# Patient Record
Sex: FEMALE | Race: OTHER | NOT HISPANIC OR LATINO
[De-identification: names, ages, dates, MRNs, and addresses within clinical notes are randomized per-mention and may not be internally consistent; named-entity substitution may affect disease eponyms.]

---

## 2019-03-28 PROBLEM — Z00.00 ENCOUNTER FOR PREVENTIVE HEALTH EXAMINATION: Status: ACTIVE | Noted: 2019-03-28

## 2019-04-03 ENCOUNTER — APPOINTMENT (OUTPATIENT)
Dept: SURGERY | Facility: CLINIC | Age: 44
End: 2019-04-03
Payer: COMMERCIAL

## 2019-04-03 PROCEDURE — 99205K: CUSTOM

## 2019-10-09 ENCOUNTER — RESULT REVIEW (OUTPATIENT)
Age: 44
End: 2019-10-09

## 2019-10-10 ENCOUNTER — APPOINTMENT (OUTPATIENT)
Dept: SURGERY | Facility: CLINIC | Age: 44
End: 2019-10-10
Payer: COMMERCIAL

## 2019-10-10 ENCOUNTER — APPOINTMENT (OUTPATIENT)
Dept: PLASTIC SURGERY | Facility: CLINIC | Age: 44
End: 2019-10-10
Payer: COMMERCIAL

## 2019-10-10 ENCOUNTER — RESULT CHARGE (OUTPATIENT)
Age: 44
End: 2019-10-10

## 2019-10-10 DIAGNOSIS — J93.83 OTHER PNEUMOTHORAX: ICD-10-CM

## 2019-10-10 DIAGNOSIS — Z80.3 FAMILY HISTORY OF MALIGNANT NEOPLASM OF BREAST: ICD-10-CM

## 2019-10-10 DIAGNOSIS — Z78.9 OTHER SPECIFIED HEALTH STATUS: ICD-10-CM

## 2019-10-10 PROCEDURE — 99024 POSTOP FOLLOW-UP VISIT: CPT

## 2019-10-10 PROCEDURE — 99213 OFFICE O/P EST LOW 20 MIN: CPT

## 2019-10-10 NOTE — HISTORY OF PRESENT ILLNESS
[FreeTextEntry1] : 44 y/o woman s/p multiple cosmetic breast procedures, now with wise pattern scars.\par Indicated for right breast mass excision.\par Otherwise healthy.\par \par Exam: b/l AB cup size breasts, nonptotic, symmetric (R>L), wise pattern scars\par \par Because concern for creating breast deformity following resection will plan to perform breast tissue rearrangement to preserve a natural breast contour. Will utilize existing scars.\par NAture of surgery, risks, benefits, alternatives, expected postoperative course, recovery and long term results reviewed. All questions answered.\par Will coordinate with Dr. Robledo for the near future.\par

## 2019-10-15 NOTE — HISTORY OF PRESENT ILLNESS
[FreeTextEntry1] : Mounika is a 42 yo female presents for consultation regarding right breast papilloma found on diagnostic mammogram. To note, she was previously seen by Dr. Nichol Robison at [facility]. 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 LEWIS of 18.4%\par \par She has a history of breast implant placement and removal. Initial breast saline implant placement in  with subsequent surgery secondary to capsular contracture of the right breast with removal and replacement of b/l breast implants. In 2016, she underwent b/l breast implant removal with capsulectomy followed by mastopexy 3-4 months later. \par \par Family h/o cancer: mother dx at age 70 with DCIS, father diagnosed with colon cancer age 50 and  from the disease\par Mother underwent genetic testing with reportedly negative results (VUS)

## 2019-10-15 NOTE — DATA REVIEWED
[FreeTextEntry1] : --2/13/19 (R) b/l mmg & right US: echogenic area at palpable concern in the right breast at 1n8, may be related to prior surgeries but an US guided biopsy is recommended. Right breast amorphous and punctate calcifications seen in LIQ spanning 3cm, recommend stereotactic biopsy. BI-RADS 4. \par --2/25/19 (R) right breast stereotactic biopsy pathology small papilloma with usual ductal hyperplasia and calcifications. US biopsy, right: benign breast tissue w/ dense stromal fibrosis.

## 2019-10-15 NOTE — PAST MEDICAL HISTORY
[Menstruating] : The patient is menstruating [Menarche Age ____] : age at menarche was [unfilled] [Total Preg ___] : G[unfilled] [Definite ___ (Date)] : the last menstrual period was [unfilled] [Living ___] : Living: [unfilled] [Live Births ___] : P[unfilled]  [Age At Live Birth ___] : Age at live birth: [unfilled] [FreeTextEntry5] :  in 2001 [FreeTextEntry6] : NA [FreeTextEntry7] : NA [FreeTextEntry8] : No hx

## 2019-10-25 ENCOUNTER — APPOINTMENT (OUTPATIENT)
Dept: MRI IMAGING | Facility: HOSPITAL | Age: 44
End: 2019-10-25

## 2019-10-25 ENCOUNTER — OUTPATIENT (OUTPATIENT)
Dept: OUTPATIENT SERVICES | Facility: HOSPITAL | Age: 44
LOS: 1 days | End: 2019-10-25
Payer: COMMERCIAL

## 2019-10-25 PROCEDURE — C8937: CPT

## 2019-10-25 PROCEDURE — A9585: CPT

## 2019-10-25 PROCEDURE — 77049 MRI BREAST C-+ W/CAD BI: CPT

## 2019-10-25 PROCEDURE — 77049 MRI BREAST C-+ W/CAD BI: CPT | Mod: 26

## 2019-10-28 ENCOUNTER — APPOINTMENT (OUTPATIENT)
Dept: INTERNAL MEDICINE | Facility: CLINIC | Age: 44
End: 2019-10-28

## 2019-11-01 ENCOUNTER — APPOINTMENT (OUTPATIENT)
Dept: MRI IMAGING | Facility: HOSPITAL | Age: 44
End: 2019-11-01

## 2019-11-04 ENCOUNTER — RESULT REVIEW (OUTPATIENT)
Age: 44
End: 2019-11-04

## 2019-11-04 ENCOUNTER — OUTPATIENT (OUTPATIENT)
Dept: OUTPATIENT SERVICES | Facility: HOSPITAL | Age: 44
LOS: 1 days | End: 2019-11-04
Payer: COMMERCIAL

## 2019-11-04 ENCOUNTER — APPOINTMENT (OUTPATIENT)
Dept: MAMMOGRAPHY | Facility: HOSPITAL | Age: 44
End: 2019-11-04

## 2019-11-04 PROCEDURE — 19281 PERQ DEVICE BREAST 1ST IMAG: CPT | Mod: 59,RT

## 2019-11-04 PROCEDURE — 19083 BX BREAST 1ST LESION US IMAG: CPT | Mod: RT

## 2019-11-04 PROCEDURE — 19084 BX BREAST ADD LESION US IMAG: CPT | Mod: LT

## 2019-11-04 PROCEDURE — 76642 ULTRASOUND BREAST LIMITED: CPT | Mod: 26,50

## 2019-11-05 LAB — SURGICAL PATHOLOGY STUDY: SIGNIFICANT CHANGE UP

## 2019-11-05 PROCEDURE — 88305 TISSUE EXAM BY PATHOLOGIST: CPT

## 2019-11-05 PROCEDURE — 77065 DX MAMMO INCL CAD UNI: CPT

## 2019-11-05 PROCEDURE — 77065 DX MAMMO INCL CAD UNI: CPT | Mod: 26,RT

## 2019-11-05 PROCEDURE — C1739: CPT

## 2019-11-05 PROCEDURE — A4648: CPT

## 2019-11-05 PROCEDURE — 19083 BX BREAST 1ST LESION US IMAG: CPT

## 2019-11-05 PROCEDURE — 76642 ULTRASOUND BREAST LIMITED: CPT

## 2019-11-05 PROCEDURE — 19084 BX BREAST ADD LESION US IMAG: CPT

## 2019-11-05 PROCEDURE — 19281 PERQ DEVICE BREAST 1ST IMAG: CPT

## 2019-11-17 ENCOUNTER — RESULT REVIEW (OUTPATIENT)
Age: 44
End: 2019-11-17

## 2019-11-21 VITALS
SYSTOLIC BLOOD PRESSURE: 104 MMHG | OXYGEN SATURATION: 98 % | HEART RATE: 80 BPM | TEMPERATURE: 98 F | DIASTOLIC BLOOD PRESSURE: 71 MMHG | RESPIRATION RATE: 18 BRPM | HEIGHT: 67 IN | WEIGHT: 132.72 LBS

## 2019-11-21 NOTE — ASU PATIENT PROFILE, ADULT - PMH
Breast anomaly  RIGHT BREAST PAPILLOMA Breast anomaly  RIGHT BREAST PAPILLOMA  Pneumothorax Breast anomaly  RIGHT BREAST PAPILLOMA  Pneumothorax  Left

## 2019-11-21 NOTE — ASU PATIENT PROFILE, ADULT - PSH
Elective surgery  mamoplasty Elective surgery  breast implant removed  Elective surgery    Elective surgery  mamoplasty  History of     History of lung surgery  VATS

## 2019-11-22 ENCOUNTER — APPOINTMENT (OUTPATIENT)
Dept: SURGERY | Facility: CLINIC | Age: 44
End: 2019-11-22
Payer: COMMERCIAL

## 2019-11-22 ENCOUNTER — RESULT REVIEW (OUTPATIENT)
Age: 44
End: 2019-11-22

## 2019-11-22 ENCOUNTER — OUTPATIENT (OUTPATIENT)
Dept: INPATIENT UNIT | Facility: HOSPITAL | Age: 44
LOS: 1 days | Discharge: HOME | End: 2019-11-22
Payer: COMMERCIAL

## 2019-11-22 VITALS
SYSTOLIC BLOOD PRESSURE: 106 MMHG | HEART RATE: 86 BPM | RESPIRATION RATE: 16 BRPM | OXYGEN SATURATION: 99 % | DIASTOLIC BLOOD PRESSURE: 65 MMHG

## 2019-11-22 DIAGNOSIS — Z98.891 HISTORY OF UTERINE SCAR FROM PREVIOUS SURGERY: Chronic | ICD-10-CM

## 2019-11-22 DIAGNOSIS — Z41.9 ENCOUNTER FOR PROCEDURE FOR PURPOSES OTHER THAN REMEDYING HEALTH STATE, UNSPECIFIED: Chronic | ICD-10-CM

## 2019-11-22 DIAGNOSIS — Z98.890 OTHER SPECIFIED POSTPROCEDURAL STATES: Chronic | ICD-10-CM

## 2019-11-22 PROCEDURE — 19366: CPT | Mod: RT

## 2019-11-22 PROCEDURE — 88304 TISSUE EXAM BY PATHOLOGIST: CPT

## 2019-11-22 PROCEDURE — 76098 X-RAY EXAM SURGICAL SPECIMEN: CPT | Mod: 26

## 2019-11-22 PROCEDURE — 19125 EXCISION BREAST LESION: CPT

## 2019-11-22 PROCEDURE — 76098 X-RAY EXAM SURGICAL SPECIMEN: CPT

## 2019-11-22 PROCEDURE — 19125 EXCISION BREAST LESION: CPT | Mod: RT

## 2019-11-22 PROCEDURE — 14301 TIS TRNFR ANY 30.1-60 SQ CM: CPT

## 2019-11-22 RX ORDER — BENZOCAINE AND MENTHOL 5; 1 G/100ML; G/100ML
1 LIQUID ORAL
Refills: 0 | Status: DISCONTINUED | OUTPATIENT
Start: 2019-11-22 | End: 2019-11-22

## 2019-11-22 RX ORDER — ONDANSETRON 8 MG/1
4 TABLET, FILM COATED ORAL ONCE
Refills: 0 | Status: DISCONTINUED | OUTPATIENT
Start: 2019-11-22 | End: 2019-11-22

## 2019-11-22 RX ORDER — SODIUM CHLORIDE 9 MG/ML
1000 INJECTION, SOLUTION INTRAVENOUS
Refills: 0 | Status: DISCONTINUED | OUTPATIENT
Start: 2019-11-22 | End: 2019-11-22

## 2019-11-22 RX ORDER — OXYCODONE AND ACETAMINOPHEN 5; 325 MG/1; MG/1
1 TABLET ORAL ONCE
Refills: 0 | Status: DISCONTINUED | OUTPATIENT
Start: 2019-11-22 | End: 2019-11-22

## 2019-11-22 RX ORDER — BUPIVACAINE 13.3 MG/ML
20 INJECTION, SUSPENSION, LIPOSOMAL INFILTRATION ONCE
Refills: 0 | Status: DISCONTINUED | OUTPATIENT
Start: 2019-11-22 | End: 2019-11-22

## 2019-11-22 RX ORDER — HYDROMORPHONE HYDROCHLORIDE 2 MG/ML
0.5 INJECTION INTRAMUSCULAR; INTRAVENOUS; SUBCUTANEOUS
Refills: 0 | Status: DISCONTINUED | OUTPATIENT
Start: 2019-11-22 | End: 2019-11-22

## 2019-11-22 NOTE — BRIEF OPERATIVE NOTE - OPERATION/FINDINGS
Saviscout localization of breast mass. Excision of breast mass using electrocautery, specimen sent for pathological evaluation. Xray confirmation of clip within specimen. Hemostasis achieved. Plastic surgery primary closure of wound with 3-0 Vicryl and 3-0 Monocril suture.

## 2019-11-22 NOTE — BRIEF OPERATIVE NOTE - NSICDXBRIEFPROCEDURE_GEN_ALL_CORE_FT
PROCEDURES:  Closure, surgical wound, breast, secondary 22-Nov-2019 10:02:28  Laxmi Martel  Breast mass excision 22-Nov-2019 10:01:47  Laxmi Martel

## 2019-11-25 PROBLEM — Q83.9 CONGENITAL MALFORMATION OF BREAST, UNSPECIFIED: Chronic | Status: ACTIVE | Noted: 2019-11-21

## 2019-11-25 PROBLEM — J93.9 PNEUMOTHORAX, UNSPECIFIED: Chronic | Status: ACTIVE | Noted: 2019-11-21

## 2019-11-26 LAB — SURGICAL PATHOLOGY STUDY: SIGNIFICANT CHANGE UP

## 2019-12-03 ENCOUNTER — RESULT REVIEW (OUTPATIENT)
Age: 44
End: 2019-12-03

## 2019-12-05 ENCOUNTER — APPOINTMENT (OUTPATIENT)
Dept: BREAST CENTER | Facility: CLINIC | Age: 44
End: 2019-12-05

## 2019-12-05 ENCOUNTER — APPOINTMENT (OUTPATIENT)
Dept: PLASTIC SURGERY | Facility: CLINIC | Age: 44
End: 2019-12-05
Payer: COMMERCIAL

## 2019-12-05 ENCOUNTER — APPOINTMENT (OUTPATIENT)
Dept: SURGERY | Facility: CLINIC | Age: 44
End: 2019-12-05

## 2019-12-05 ENCOUNTER — APPOINTMENT (OUTPATIENT)
Dept: BREAST CENTER | Facility: CLINIC | Age: 44
End: 2019-12-05
Payer: COMMERCIAL

## 2019-12-05 VITALS
TEMPERATURE: 97.8 F | WEIGHT: 130 LBS | HEART RATE: 82 BPM | DIASTOLIC BLOOD PRESSURE: 76 MMHG | RESPIRATION RATE: 16 BRPM | BODY MASS INDEX: 20.4 KG/M2 | SYSTOLIC BLOOD PRESSURE: 109 MMHG | HEIGHT: 67 IN

## 2019-12-05 DIAGNOSIS — Z80.3 FAMILY HISTORY OF MALIGNANT NEOPLASM OF BREAST: ICD-10-CM

## 2019-12-05 DIAGNOSIS — R92.2 INCONCLUSIVE MAMMOGRAM: ICD-10-CM

## 2019-12-05 PROCEDURE — 99024 POSTOP FOLLOW-UP VISIT: CPT

## 2019-12-06 NOTE — HISTORY OF PRESENT ILLNESS
[FreeTextEntry1] : Mounika is a 43 yo female presents for post-op, she is 2 weeks s/p right excisional biopsy for an intraductal papilloma on 11/22/19, final surgical pathology was benign. LEWIS of 18.4% Patient had no postoperative issues. Saw Dr. Michele today as well. Healing well.\par \par Discussed with patient that she is still high risk, and has strong family history of breast cancer. She would like to be followed in the high risk program. \par \par She has a history of breast implant placement and removal. Initial breast saline implant placement in 2001 with subsequent surgery secondary to capsular contracture of the right breast with removal and replacement of b/l breast implants. In 2016, she underwent b/l breast implant removal with capsulectomy followed by mastopexy 3-4 months later. \par \par

## 2019-12-06 NOTE — PHYSICAL EXAM
[Normocephalic] : normocephalic [Atraumatic] : atraumatic [Supple] : supple [Examined in the supine and seated position] : examined in the supine and seated position [No dominant masses] : no dominant masses in right breast  [No Supraclavicular Adenopathy] : no supraclavicular adenopathy [No dominant masses] : no dominant masses left breast [No Nipple Retraction] : no right nipple retraction [No Nipple Discharge] : no left nipple discharge [No Edema] : no edema [No Axillary Lymphadenopathy] : no left axillary lymphadenopathy [No Ulceration] : no ulceration [No Rashes] : no rashes

## 2019-12-06 NOTE — DATA REVIEWED
[FreeTextEntry1] : --2/13/19 ([facility]) b/l mmg & right US: echogenic area at palpable concern in the right breast at 1n8, may be related to prior surgeries but an US guided biopsy is recommended. Right breast amorphous and punctate calcifications seen in LIQ spanning 3cm, recommend stereotactic biopsy. BI-RADS 4. \par --2/25/19 ([facility]) right breast stereotactic biopsy pathology small papilloma with usual ductal hyperplasia and calcifications. US biopsy, right: benign breast tissue w/ dense stromal fibrosis. \par --10/25/19 ([facility]) b/l breast MRI showing right 11:00 8cmFN a o.8cm circumscribed oval shaped enhancing lesion, possibly an intramammary lymph node however not definite fat is visualized, targeted US and possible US guided core biopsy. Two adjacent enhancing lesions at 9:00 right 3.6cm measuring 0.6cm and 0.3cm, the more lateral of the two possibly represents an IM lymph node, however no definite fat seen, Targeted US with possible US core biopsy recommended. Left 5:30 2.5cmFN 0.7cm enhancing lesion associated with non mass enhancement seen extending 1cm posterior in a linear distribution, targeted US with US biopsy recommended. BIRADS 4 \par --11/4/19 ([facility]) left 5:30 2.5cmFN pathology benign breast tissue with nodular adenosis and fibrocystic change. right 11:00 8cmFN pathology fibroadenomatous and fibrocystic changes. \par \par --11/22/19 ([facility]) right breast excisional biopsy pathology showing intraductal papilloma with apocrine metaplasia and fibroid UDH, surrounding breast with fibrocystic change and duct fibroadenomatoid change and UDH, epithelial microcalcifications present, biopsy clip present\par

## 2019-12-11 NOTE — HISTORY OF PRESENT ILLNESS
[FreeTextEntry1] : 43 y/o F here 2 weeks PO s/p right excisional biopsy on 11/22/19 with local tissue rearrangement in right breast. Doing well. pain controlled. \par Incisions C/D/I, no collection or infection, right breast volume < left\par Do not recommend surgical intervention at this time\par Can consider revision procedure for symmetry in future\par RTC in 3 months

## 2020-02-19 NOTE — HISTORY OF PRESENT ILLNESS
[FreeTextEntry1] : 45 y/o F here 3 months PO s/p right excisional biopsy on 11/22/19 with local tissue rearrangement in right breast. Doing well. pain controlled. \par Incisions C/D/I, no collection or infection, right breast volume < left\par Do not recommend surgical intervention at this time\par Can consider revision procedure for symmetry in future\par RTC in 3 months

## 2020-02-20 ENCOUNTER — APPOINTMENT (OUTPATIENT)
Dept: PLASTIC SURGERY | Facility: CLINIC | Age: 45
End: 2020-02-20

## 2020-02-27 ENCOUNTER — APPOINTMENT (OUTPATIENT)
Dept: PLASTIC SURGERY | Facility: CLINIC | Age: 45
End: 2020-02-27

## 2020-03-23 ENCOUNTER — TRANSCRIPTION ENCOUNTER (OUTPATIENT)
Age: 45
End: 2020-03-23

## 2020-04-26 ENCOUNTER — MESSAGE (OUTPATIENT)
Age: 45
End: 2020-04-26

## 2021-06-22 ENCOUNTER — APPOINTMENT (OUTPATIENT)
Dept: ANTEPARTUM | Facility: CLINIC | Age: 46
End: 2021-06-22

## 2022-07-22 ENCOUNTER — NON-APPOINTMENT (OUTPATIENT)
Age: 47
End: 2022-07-22

## 2022-07-22 PROBLEM — R92.8 ABNORMAL FINDING ON RADIOLOGICAL EXAMINATION OF BREAST: Status: ACTIVE | Noted: 2019-11-04

## 2022-07-22 PROBLEM — D24.1 INTRADUCTAL PAPILLOMA OF RIGHT BREAST: Status: ACTIVE | Noted: 2019-10-10

## 2022-07-22 NOTE — ASSESSMENT
[FreeTextEntry1] : 46 year old female presents for initial evaluation s/p bilateral benign US-guided core biopsies 6/30/22 of right breast 10:00 7cmfn mass revealing fibroadenoma and left breast 11:00 4cmfn mass revealing benign fibrofatty breast tissue, with recommendation for B/L targeted US. Patient is also recommended for follow-up breast MRI upon [facility] review of outside MRI 2019 demonstrated multiple b/l enhancing lesions which required b/l US-guided biopsies in 2019, results benign. Of note, patient with history of right excisional biopsy with local tissue rearrangement for IDP on 11/22/19, final surgical pathology benign. Patient also has a history of breast implant placement and removal.  LEWIS of 18.4%. \par \par Patient without complaint. Physical exam WNL. Plan B/L MRI in 1-2 weeks, will call with results, and if benign, plan for B/L targeted US and re-examination in December 2022??? Patient verbalizes understanding and is in agreement with the plan.\par

## 2022-07-22 NOTE — HISTORY OF PRESENT ILLNESS
[FreeTextEntry1] : 46 year old female referred by Dr. Krause (type of Dr?) who presents for initial evaluation s/p bilateral benign US-guided core biopsies 22 of right breast 10:00 7cmfn mass revealing fibroadenoma and left breast 11:00 4cmfn mass revealing benign fibrofatty breast tissue, with recommendation for B/L targeted US. Patient is also recommended for follow-up breast MRI upon [facility] review of outside MRI  demonstrated multiple b/l enhancing lesions which required b/l US-guided biopsies in 2019, results benign. \par \par Of note, patient was last seen 19 by Dr. Rush and plastic surgeon Dr. Michele s/p right excisional biopsy with local tissue rearrangement for an intraductal papilloma on 19, final surgical pathology benign.\par \par Patient also has a history of breast implant placement and removal. Initial breast saline implant placement in  with subsequent surgery secondary to capsular contracture of the right breast with removal and replacement of b/l breast implants. In , she underwent b/l breast implant removal with capsulectomy followed by mastopexy 3-4 months later. \par \par Famhx:\par Mother dx at age 70 with DCIS, father diagnosed with colon cancer age 50 and  from the disease\par Mother underwent genetic testing with reportedly negative results (VUS)\par \par LEWIS of 18.4%

## 2022-07-22 NOTE — REASON FOR VISIT
[Consultation] : a consultation visit [FreeTextEntry1] : s/p bilateral benign biopsies, h/o IDP, famhx

## 2022-07-22 NOTE — PHYSICAL EXAM
[Supple] : supple [No Supraclavicular Adenopathy] : no supraclavicular adenopathy [Examined in the supine and seated position] : examined in the supine and seated position [No dominant masses] : no dominant masses in right breast  [No dominant masses] : no dominant masses left breast [No Nipple Retraction] : no left nipple retraction [No Nipple Discharge] : no left nipple discharge [No Axillary Lymphadenopathy] : no left axillary lymphadenopathy [No Edema] : no edema [No Rashes] : no rashes [No Ulceration] : no ulceration [de-identified] : well healing post-surgical scar

## 2022-07-22 NOTE — DATA REVIEWED
[FreeTextEntry1] : --2/13/19 ([facility]) b/l mmg & right US: echogenic area at palpable concern in the right breast at 1n8, may be related to prior surgeries but an US guided biopsy is recommended. Right breast amorphous and punctate calcifications seen in LIQ spanning 3cm, recommend stereotactic biopsy. BI-RADS 4. \par --2/25/19 ([facility]) right breast stereotactic biopsy pathology small papilloma with usual ductal hyperplasia and calcifications. US biopsy, right: benign breast tissue w/ dense stromal fibrosis. \par --10/25/19 ([facility]) b/l breast MRI showing right 11:00 8cmFN a 0.8cm circumscribed oval shaped enhancing lesion, possibly an intramammary lymph node however not definite fat is visualized, targeted US and possible US guided core biopsy. Two adjacent enhancing lesions at 9:00 right 3.6cm measuring 0.6cm and 0.3cm, the more lateral of the two possibly represents an IM lymph node, however no definite fat seen, Targeted US with possible US core biopsy recommended. Left 5:30 2.5cmFN 0.7cm enhancing lesion associated with non mass enhancement seen extending 1cm posterior in a linear distribution, targeted US with US biopsy recommended. BIRADS 4 \par --11/4/19 ([facility]) left 5:30 2.5cmFN pathology benign breast tissue with nodular adenosis and fibrocystic change. right 11:00 8cmFN pathology fibroadenomatous and fibrocystic changes. \par --11/22/19 ([facility]) right breast excisional biopsy pathology showing intraductal papilloma with apocrine metaplasia and fibroid UDH, surrounding breast with fibrocystic change and duct fibroadenomatoid change and UDH, epithelial microcalcifications present, biopsy clip present\par \par 6/13/22 ([facility]) B/L sMMG/US: heterogeneously dense. \par 1. Suspicious findings: Suspicious complex hypoechoic masses at the right 10:00 axis 7cmfn measuring 8 mm and at the left 11:00 axis 4cmfn measuring 7 mm. RECOMMENDATION: Bilateral ultrasound-guided needle biopsies \par 2. Dominant left breast mass seen in the left inferior retroareolar region corresponds to a cyst cluster on ultrasound.\par 3. Status post additional bilateral needle biopsies of the 2019 exam. As always direct comparison with the outside imaging is recommended for these findings.\par 4. Status post interval right excisional biopsy for intraductal papillomas since the 2019 exam.\par BI-RADS 4:  Suspicious. \par 6/14/22 ADDENDUM: The patient submitted her outside mammograms for comparison from 2/25/19 and 2/13/19. Ultrasound images from the biopsy 11/4/2019 was also submitted. \par -The right 11:00 axis lesion documented on the outside study was not documented on the current study. -The pathology of the right breast excisional biopsy performed at the outside institution yielded IDP. -Additional clinical history was submitted demonstrating the patient is status post explantation. \par -Outside MRI scan from 2019 had demonstrated multiple enhancing lesions in both breasts for which biopsy was recommended. Status post left ultrasound core biopsy left 5:30 axis yielding benign breast tissue with nodular adenosis and fibrocystic change in 2019. Status post right ultrasound core biopsy yielding fibroadenomatous and fibrocystic changes at the 11:00 axis in 2019. Follow-up MRI had been recommended and was reportedly performed at [facility] in 2020 (which is not available to time this addendum). In light of prior MRI biopsies, follow-up breast MRI is also recommended in addition to bilateral ultrasound core biopsies.\par FOLLOW-UP: \par 1. Bilateral ultrasound core biopsies as indicated in the body the report.\par 2. Follow-up breast MRI.\par BI-RADS 4:  Suspicious.\par \par 6/30/22 ([facility]) B/L US-GUIDED CORE BIOPSIES x 2 sites:\par Site 1 - Right 10:00 7cmfn mass: fibroadenoma\par Site 2 - Left 11:00 4cmfn mass: benign fibrofatty breast tissue\par Concordant. FOLLOW-UP: 6 month B/L targeted US \par *In addition, however, the patient was reminded to schedule breast MRI (with contrast) given prior MRI guided biopsies and for the purpose of follow-up as per recent recommendations. The patient will provide outside 2021 and 2019 MRI on CD with associated reports.

## 2022-07-26 ENCOUNTER — NON-APPOINTMENT (OUTPATIENT)
Age: 47
End: 2022-07-26

## 2022-07-27 ENCOUNTER — APPOINTMENT (OUTPATIENT)
Dept: BREAST CENTER | Facility: CLINIC | Age: 47
End: 2022-07-27

## 2022-07-27 DIAGNOSIS — R92.8 OTHER ABNORMAL AND INCONCLUSIVE FINDINGS ON DIAGNOSTIC IMAGING OF BREAST: ICD-10-CM

## 2022-07-27 DIAGNOSIS — D24.1 BENIGN NEOPLASM OF RIGHT BREAST: ICD-10-CM

## 2022-09-28 ENCOUNTER — APPOINTMENT (OUTPATIENT)
Dept: NEUROLOGY | Facility: CLINIC | Age: 47
End: 2022-09-28

## 2023-04-10 ENCOUNTER — APPOINTMENT (OUTPATIENT)
Dept: PLASTIC SURGERY | Facility: CLINIC | Age: 48
End: 2023-04-10
Payer: SELF-PAY

## 2023-04-10 VITALS — HEIGHT: 67 IN | OXYGEN SATURATION: 99 % | WEIGHT: 135 LBS | HEART RATE: 72 BPM | BODY MASS INDEX: 21.19 KG/M2

## 2023-04-10 PROCEDURE — 99499 UNLISTED E&M SERVICE: CPT | Mod: NC

## 2023-04-11 NOTE — HISTORY OF PRESENT ILLNESS
[FreeTextEntry1] : 46 y/o female presents  4 years s/p right excisional biopsy on 11/22/19 with local tissue rearrangement in right breast. Doing well. pain controlled. Patient had [place] silicone implants placed in 12/01/2022 with Dr. Jarrett. Patient developed a wound on the right breast, mesh exposed, on 01/03/2023 the right implant was removed. \par Implants: [place] smooth round moderate 100 cc left; 170 cc on the right smooth round moderate plus\par She will confirm implant information and email\par \par Well healed wise pattern incisions, no collection or infection, right breast volume < left, left NAC displaced laterally, right lower pole with lack of fullness, left IMF displaced superiorly\par Discussed replacement of bilateral implants with larger, wider size, revision/plication of left medial IMF incision, right breast will leave medial part of IMF scar due to prior wound healing complications.\par \par Nature of surgery, risks, benefits, alternatives expected postoperative course, recovery and long term results reviewed.\par Will coordinate for the near future.

## 2023-04-11 NOTE — SURGICAL HISTORY
[de-identified] : 11/22/19: right excisional biopsy with local tissue rearrangement in right breast

## 2023-04-19 ENCOUNTER — APPOINTMENT (OUTPATIENT)
Dept: PLASTIC SURGERY | Facility: CLINIC | Age: 48
End: 2023-04-19
Payer: SELF-PAY

## 2023-04-19 ENCOUNTER — TRANSCRIPTION ENCOUNTER (OUTPATIENT)
Age: 48
End: 2023-04-19

## 2023-04-19 PROCEDURE — 99499Q: CUSTOM | Mod: NC

## 2023-04-19 NOTE — HISTORY OF PRESENT ILLNESS
[Home] : at home, [unfilled] , at the time of the visit. [Other Location: e.g. Home (Enter Location, City,State)___] : at [unfilled] [Verbal consent obtained from patient] : the patient, [unfilled] [FreeTextEntry1] : Patient Name: AGUILAR RODRIGUEZ \par : 1975 \par Date: 2023 \par Attending: Dr. Cresencio Michele\par \par HPI: preop consultation for left breast implant removal, bilateral implant replacement on 23.\par \par Allergies: NKDA\par Medication prescribed: percocet 5-325 mg\par \par ROS: complete 14 point review of systems negative except pertinent items reviewed in the HPI. Other non-contributory items reviewed in our new patient questionnaire and we have submitted it to be scanned into the medical record. \par \par Physical Exam: \par completed at time of in office evaluation \par \par Assessment/Plan:\par We have discussed pre and postop instructions, recovery limitations, restrictions and expectations, ERAS protocol, NPO status, transportation home, postop medications, COVID-19 policies, benefits and risks of the procedure. Pre-op labs reviewed. The patient would like to proceed with surgery as scheduled.\par \par LEE ALSTON NP\par \par

## 2023-04-24 ENCOUNTER — APPOINTMENT (OUTPATIENT)
Dept: PLASTIC SURGERY | Facility: CLINIC | Age: 48
End: 2023-04-24
Payer: SELF-PAY

## 2023-04-24 PROCEDURE — 99499 UNLISTED E&M SERVICE: CPT | Mod: NC

## 2023-04-24 NOTE — HISTORY OF PRESENT ILLNESS
[FreeTextEntry1] : 48 y/o female presents 4 years s/p right excisional biopsy on 11/22/19 with local tissue rearrangement in right breast presents today to review surgical plan for 4/29/23. Patient had [place] silicone implants placed in 12/01/2022 with Dr. Jarrett. Patient developed a wound on the right breast, mesh exposed, on 01/03/2023 the right implant was removed. \par Implants: [place] smooth round moderate 100 cc left; 170 cc on the right (currently absent) smooth round moderate plus.\par \par Discussed replacement of bilateral implants with larger, wider size, revision/plication of left medial IMF incision, right breast will leave medial part of IMF scar due to prior wound healing complications. Will order  cc as well as previous order. \par \par Plan for surgery later this week.\par

## 2023-04-24 NOTE — SURGICAL HISTORY
[de-identified] : 11/22/19: right excisional biopsy with local tissue rearrangement in right breast

## 2023-04-27 ENCOUNTER — TRANSCRIPTION ENCOUNTER (OUTPATIENT)
Age: 48
End: 2023-04-27

## 2023-04-27 NOTE — ASU PATIENT PROFILE, ADULT - NS PREOP UNDERSTANDS INFO
spoke to patient to be NPO/NO solid foods after 12MN,  allow to drink water  till 2-3 am , dress comfortable. . leave all valuable at home, no lotions, no jewelry, . Bring ID photo, and insurance card if needed. . Escort arranged, address and telephone given to patient as needed/yes

## 2023-04-27 NOTE — ASU PATIENT PROFILE, ADULT - SITE
removal left breat  implant, bilateral , Bilateral  breast implant placement  laterality : bilateral

## 2023-04-27 NOTE — ASU PATIENT PROFILE, ADULT - NSICDXPASTSURGICALHX_GEN_ALL_CORE_FT
PAST SURGICAL HISTORY:  Elective surgery mamoplasty    Elective surgery     Elective surgery breast implant removed    History of      History of lung surgery VATS

## 2023-04-27 NOTE — ASU PATIENT PROFILE, ADULT - HEALTHCARE INFORMATION NEEDED, PROFILE
post operative instructions , medication to pharmacy, and post surgical fallow up appointment with MD

## 2023-04-28 ENCOUNTER — OUTPATIENT (OUTPATIENT)
Dept: OUTPATIENT SERVICES | Facility: HOSPITAL | Age: 48
LOS: 1 days | Discharge: HOME | End: 2023-04-28
Payer: SELF-PAY

## 2023-04-28 ENCOUNTER — TRANSCRIPTION ENCOUNTER (OUTPATIENT)
Age: 48
End: 2023-04-28

## 2023-04-28 VITALS
WEIGHT: 138.23 LBS | DIASTOLIC BLOOD PRESSURE: 75 MMHG | RESPIRATION RATE: 16 BRPM | HEART RATE: 78 BPM | TEMPERATURE: 98 F | HEIGHT: 67 IN | SYSTOLIC BLOOD PRESSURE: 103 MMHG | OXYGEN SATURATION: 100 %

## 2023-04-28 VITALS
DIASTOLIC BLOOD PRESSURE: 67 MMHG | TEMPERATURE: 97 F | SYSTOLIC BLOOD PRESSURE: 105 MMHG | RESPIRATION RATE: 18 BRPM | OXYGEN SATURATION: 98 % | HEART RATE: 72 BPM

## 2023-04-28 DIAGNOSIS — Z41.9 ENCOUNTER FOR PROCEDURE FOR PURPOSES OTHER THAN REMEDYING HEALTH STATE, UNSPECIFIED: Chronic | ICD-10-CM

## 2023-04-28 DIAGNOSIS — Z98.890 OTHER SPECIFIED POSTPROCEDURAL STATES: Chronic | ICD-10-CM

## 2023-04-28 DIAGNOSIS — Z98.891 HISTORY OF UTERINE SCAR FROM PREVIOUS SURGERY: Chronic | ICD-10-CM

## 2023-04-28 PROCEDURE — 19328 RMVL INTACT BREAST IMPLANT: CPT | Mod: LT

## 2023-04-28 PROCEDURE — 19325 BREAST AUGMENTATION W/IMPLT: CPT | Mod: 50

## 2023-04-28 DEVICE — IMPLANTABLE DEVICE
Type: IMPLANTABLE DEVICE | Site: BILATERAL | Status: NON-FUNCTIONAL
Removed: 2023-04-28

## 2023-04-28 RX ORDER — APREPITANT 80 MG/1
40 CAPSULE ORAL ONCE
Refills: 0 | Status: COMPLETED | OUTPATIENT
Start: 2023-04-28 | End: 2023-04-28

## 2023-04-28 RX ORDER — FENTANYL CITRATE 50 UG/ML
25 INJECTION INTRAVENOUS
Refills: 0 | Status: DISCONTINUED | OUTPATIENT
Start: 2023-04-28 | End: 2023-04-28

## 2023-04-28 RX ORDER — ACETAMINOPHEN 500 MG
1000 TABLET ORAL ONCE
Refills: 0 | Status: COMPLETED | OUTPATIENT
Start: 2023-04-28 | End: 2023-04-28

## 2023-04-28 RX ORDER — SODIUM CHLORIDE 9 MG/ML
1000 INJECTION, SOLUTION INTRAVENOUS
Refills: 0 | Status: DISCONTINUED | OUTPATIENT
Start: 2023-04-28 | End: 2023-04-28

## 2023-04-28 RX ADMIN — Medication 1000 MILLIGRAM(S): at 11:20

## 2023-04-28 RX ADMIN — APREPITANT 40 MILLIGRAM(S): 80 CAPSULE ORAL at 11:20

## 2023-04-28 RX ADMIN — FENTANYL CITRATE 25 MICROGRAM(S): 50 INJECTION INTRAVENOUS at 16:11

## 2023-04-28 RX ADMIN — FENTANYL CITRATE 25 MICROGRAM(S): 50 INJECTION INTRAVENOUS at 15:41

## 2023-04-28 NOTE — ASU DISCHARGE PLAN (ADULT/PEDIATRIC) - NS MD DC FALL RISK RISK
For information on Fall & Injury Prevention, visit: [email or web address] OR  [email or web address] OR  https://www.cdc.gov/steadi/patient.html

## 2023-04-28 NOTE — ASU DISCHARGE PLAN (ADULT/PEDIATRIC) - CARE PROVIDER_API CALL
Cresencio Michele)  Plastic Surgery  [address] [address]  Phone: [phone]  Fax: [phone]  Follow Up Time: Routine

## 2023-05-01 ENCOUNTER — APPOINTMENT (OUTPATIENT)
Dept: PLASTIC SURGERY | Facility: CLINIC | Age: 48
End: 2023-05-01
Payer: SELF-PAY

## 2023-05-01 VITALS — HEIGHT: 67 IN | HEART RATE: 94 BPM | BODY MASS INDEX: 21.19 KG/M2 | WEIGHT: 135 LBS | OXYGEN SATURATION: 99 %

## 2023-05-01 PROCEDURE — 99499 UNLISTED E&M SERVICE: CPT | Mod: NC

## 2023-05-01 RX ORDER — OXYCODONE AND ACETAMINOPHEN 5; 325 MG/1; MG/1
5-325 TABLET ORAL
Qty: 20 | Refills: 0 | Status: DISCONTINUED | COMMUNITY
Start: 2023-04-19 | End: 2023-05-01

## 2023-05-01 NOTE — HISTORY OF PRESENT ILLNESS
[FreeTextEntry1] : 46 y/o female presents 3 days s/p removal of left breast implant and bilateral breast implant placement. Denies any f/c/n/v. Patient is taking tylenol for pain. She is happy with her results.\par \par PE: Incisions c/d/i, no collections or s/sx of infection, prineo in place, good contour and symmetry, left NAC sutures in place\par Implants: Left 210 cc, Right 240 cc\par PO instructions reviewed \par RTC in 1 week for suture removal

## 2023-05-01 NOTE — SURGICAL HISTORY
[de-identified] : 11/22/19: right excisional biopsy with local tissue rearrangement in right breast [de-identified] : 04/28/23: removal of left breast implant and bilateral breast implant

## 2023-05-08 ENCOUNTER — APPOINTMENT (OUTPATIENT)
Dept: PLASTIC SURGERY | Facility: CLINIC | Age: 48
End: 2023-05-08
Payer: SELF-PAY

## 2023-05-08 VITALS — OXYGEN SATURATION: 98 % | WEIGHT: 135 LBS | BODY MASS INDEX: 21.19 KG/M2 | HEIGHT: 67 IN | HEART RATE: 98 BPM

## 2023-05-08 PROCEDURE — 99499 UNLISTED E&M SERVICE: CPT | Mod: NC

## 2023-05-09 NOTE — SURGICAL HISTORY
[de-identified] : 11/22/19: right excisional biopsy with local tissue rearrangement in right breast [de-identified] : 04/28/23: removal of left breast implant and bilateral breast implant

## 2023-05-09 NOTE — HISTORY OF PRESENT ILLNESS
[FreeTextEntry1] : 46 y/o female presents 10 days s/p removal of left breast implant and bilateral breast implant placement. Denies any f/c/n/v. Patient is taking tylenol for pain PRN. She is very happy with her results.\par \par PE: Incisions healing well, no collections or s/sx of infection, prineo removed, good contour and symmetry, left NAC sutures removed, steris placed\par Implants: Left 210 cc, Right 240 cc\par PO instructions reviewed \par Wear bra\par UE ROM exercises reviewed\par Aquaphor/silicone in 1 week\par RTC in 6 weeks

## 2023-06-05 ENCOUNTER — APPOINTMENT (OUTPATIENT)
Dept: PLASTIC SURGERY | Facility: CLINIC | Age: 48
End: 2023-06-05
Payer: SELF-PAY

## 2023-06-05 VITALS — OXYGEN SATURATION: 98 % | HEIGHT: 67 IN | BODY MASS INDEX: 21.19 KG/M2 | HEART RATE: 81 BPM | WEIGHT: 135 LBS

## 2023-06-05 PROCEDURE — 99499 UNLISTED E&M SERVICE: CPT | Mod: NC

## 2023-06-06 NOTE — SURGICAL HISTORY
[de-identified] : 11/22/19: right excisional biopsy with local tissue rearrangement in right breast [de-identified] : 04/28/23: removal of left breast implant and bilateral breast implant

## 2023-06-06 NOTE — HISTORY OF PRESENT ILLNESS
[FreeTextEntry1] : 46 y/o female presents 5 weeks s/p removal of left breast implant and bilateral breast implant placement. Denies any f/c/n/v. Patient takes Motrin prn for the pain. Patient c/o right breast pain on the bottom of her breast, she states her implant is bottoming out. Denies any redness or discharge. Patient is applying Aquaphor on her left breast. \par \par PE: Incisions well healed, no collections or s/sx of infection, right implant bottomed out, left NAC laterally displaced, left medial fullness\par Implants: Left 210 cc, Right 240 cc\par \par Today we discussed surgery to suture skin edge of right breast to chest wall, right IMF plication? to help correct bottoming out of implant and left medial plication to shift pocket more laterally. The nature of surgery, its risks, benefits, alternatives, expected postoperative course, recovery and long term results were discussed in detail. All questions were answered.\par \par

## 2023-06-07 ENCOUNTER — APPOINTMENT (OUTPATIENT)
Dept: PLASTIC SURGERY | Facility: CLINIC | Age: 48
End: 2023-06-07
Payer: SELF-PAY

## 2023-06-07 PROCEDURE — 99499Q: CUSTOM | Mod: NC

## 2023-06-08 ENCOUNTER — TRANSCRIPTION ENCOUNTER (OUTPATIENT)
Age: 48
End: 2023-06-08

## 2023-06-08 NOTE — ASU PATIENT PROFILE, ADULT - NS PREOP UNDERSTANDS INFO
spoke to patient to be NPO/ no solid foods after 12MN. allow to have clear liquids, , water, apple juice, Gatorade  till 8-9 am , dress comfortable , leave alll vlauable at home, Bring ID photo and insurance cards, escort arranged, address and telephone given to patient./yes

## 2023-06-09 ENCOUNTER — TRANSCRIPTION ENCOUNTER (OUTPATIENT)
Age: 48
End: 2023-06-09

## 2023-06-09 ENCOUNTER — OUTPATIENT (OUTPATIENT)
Dept: OUTPATIENT SERVICES | Facility: HOSPITAL | Age: 48
LOS: 1 days | Discharge: HOME | End: 2023-06-09
Payer: SELF-PAY

## 2023-06-09 VITALS
OXYGEN SATURATION: 98 % | SYSTOLIC BLOOD PRESSURE: 115 MMHG | DIASTOLIC BLOOD PRESSURE: 58 MMHG | RESPIRATION RATE: 18 BRPM | HEART RATE: 85 BPM

## 2023-06-09 VITALS
HEIGHT: 68 IN | WEIGHT: 139.77 LBS | TEMPERATURE: 98 F | RESPIRATION RATE: 16 BRPM | DIASTOLIC BLOOD PRESSURE: 68 MMHG | OXYGEN SATURATION: 99 % | SYSTOLIC BLOOD PRESSURE: 100 MMHG | HEART RATE: 81 BPM

## 2023-06-09 DIAGNOSIS — Z41.9 ENCOUNTER FOR PROCEDURE FOR PURPOSES OTHER THAN REMEDYING HEALTH STATE, UNSPECIFIED: Chronic | ICD-10-CM

## 2023-06-09 DIAGNOSIS — Z98.891 HISTORY OF UTERINE SCAR FROM PREVIOUS SURGERY: Chronic | ICD-10-CM

## 2023-06-09 DIAGNOSIS — Z98.890 OTHER SPECIFIED POSTPROCEDURAL STATES: Chronic | ICD-10-CM

## 2023-06-09 PROCEDURE — 19325 BREAST AUGMENTATION W/IMPLT: CPT

## 2023-06-09 PROCEDURE — 19371 PERI-IMPLT CAPSLC BRST COMPL: CPT

## 2023-06-09 DEVICE — IMPLANTABLE DEVICE: Type: IMPLANTABLE DEVICE | Site: BILATERAL | Status: FUNCTIONAL

## 2023-06-09 RX ORDER — ACETAMINOPHEN 500 MG
1000 TABLET ORAL ONCE
Refills: 0 | Status: COMPLETED | OUTPATIENT
Start: 2023-06-09 | End: 2023-06-09

## 2023-06-09 RX ORDER — APREPITANT 80 MG/1
40 CAPSULE ORAL ONCE
Refills: 0 | Status: COMPLETED | OUTPATIENT
Start: 2023-06-09 | End: 2023-06-09

## 2023-06-09 RX ORDER — ONDANSETRON 8 MG/1
4 TABLET, FILM COATED ORAL ONCE
Refills: 0 | Status: DISCONTINUED | OUTPATIENT
Start: 2023-06-09 | End: 2023-06-09

## 2023-06-09 RX ORDER — SODIUM CHLORIDE 9 MG/ML
1000 INJECTION, SOLUTION INTRAVENOUS
Refills: 0 | Status: DISCONTINUED | OUTPATIENT
Start: 2023-06-09 | End: 2023-06-09

## 2023-06-09 RX ORDER — HYDROMORPHONE HYDROCHLORIDE 2 MG/ML
0.5 INJECTION INTRAMUSCULAR; INTRAVENOUS; SUBCUTANEOUS ONCE
Refills: 0 | Status: DISCONTINUED | OUTPATIENT
Start: 2023-06-09 | End: 2023-06-09

## 2023-06-09 RX ORDER — OXYCODONE HYDROCHLORIDE 5 MG/1
5 TABLET ORAL ONCE
Refills: 0 | Status: DISCONTINUED | OUTPATIENT
Start: 2023-06-09 | End: 2023-06-09

## 2023-06-09 RX ADMIN — Medication 1000 MILLIGRAM(S): at 14:25

## 2023-06-09 RX ADMIN — APREPITANT 40 MILLIGRAM(S): 80 CAPSULE ORAL at 14:25

## 2023-06-09 NOTE — ASU DISCHARGE PLAN (ADULT/PEDIATRIC) - CARE PROVIDER_API CALL
Cresencio Michele  Plastic Surgery  [address] [facility], [address]  [place] [address]  Phone: [phone]  Fax: [phone]  Follow Up Time: Routine

## 2023-06-09 NOTE — BRIEF OPERATIVE NOTE - NSICDXBRIEFPROCEDURE_GEN_ALL_CORE_FT
PROCEDURES:  Cosmetic augmentation of both breasts using silicone implant 09-Jun-2023 17:43:38  Nahomi Sal

## 2023-06-09 NOTE — ASU DISCHARGE PLAN (ADULT/PEDIATRIC) - NS MD DC FALL RISK RISK
For information on Fall & Injury Prevention, visit: [email or web address] OR  [email or web address] OR  https://www.cdc.gov/steadi/patient.html

## 2023-06-15 NOTE — HISTORY OF PRESENT ILLNESS
[Home] : at home, [unfilled] , at the time of the visit. [Other Location: e.g. Home (Enter Location, City,State)___] : at [unfilled] [Verbal consent obtained from patient] : the patient, [unfilled] [FreeTextEntry1] : Patient Name: AGUILAR RODRIGUEZ \par : 1975 \par Date: 2023 \par Attending: Dr. Cresencio Michele\par \par HPI: preop consultation for bilateral implant replacement, capsulorraphy and IMF plication on 23.\par \par Allergies: NKDA\par Medication prescribed: percocet 5-325 mg\par \par ROS: complete 14 point review of systems negative except pertinent items reviewed in the HPI. Other non-contributory items reviewed in our new patient questionnaire and we have submitted it to be scanned into the medical record. \par \par Physical Exam: \par completed at time of in office evaluation \par \par Assessment/Plan:\par We have discussed pre and postop instructions, recovery limitations, restrictions and expectations, ERAS protocol, NPO status, transportation home, postop medications, benefits and risks of the procedure. Pre-op labs reviewed. The patient would like to proceed with surgery as scheduled.\par \par LEE ALSTON NP\par \par

## 2023-06-19 ENCOUNTER — APPOINTMENT (OUTPATIENT)
Dept: PLASTIC SURGERY | Facility: CLINIC | Age: 48
End: 2023-06-19
Payer: SELF-PAY

## 2023-06-19 DIAGNOSIS — Z98.82 BREAST IMPLANT STATUS: ICD-10-CM

## 2023-06-19 PROCEDURE — 99499 UNLISTED E&M SERVICE: CPT | Mod: NC

## 2023-06-19 RX ORDER — OXYCODONE AND ACETAMINOPHEN 5; 325 MG/1; MG/1
5-325 TABLET ORAL
Qty: 20 | Refills: 0 | Status: DISCONTINUED | COMMUNITY
Start: 2023-06-07 | End: 2023-06-19

## 2023-06-19 RX ORDER — IBUPROFEN 800 MG
TABLET ORAL
Refills: 0 | Status: DISCONTINUED | COMMUNITY
End: 2023-06-19

## 2023-06-20 NOTE — SURGICAL HISTORY
[de-identified] : 11/22/19: right excisional biopsy with local tissue rearrangement in right breast [de-identified] : 04/28/23: removal of left breast implant and bilateral breast implant [de-identified] : 06/09/23: bilateral implant replacement, capsulorraphy and IMF plication

## 2023-06-21 NOTE — ASU PATIENT PROFILE, ADULT - NS PRO PT RIGHT SUPPORT PERSON
Patient was in ED today and has had changes in medications.  Calling to make sure this does not interfere with her surgery scheduled on 7/5/23.  [phone]  
Taj Blanc, AKHIL  You 33 minutes ago (2:14 PM)       She had an allergic reaction to the Bactrim prescribed for her UTI (her allergy list was subsequently updated). Seen and discharged home from the ED -she is now on Nitrofurantoin and will repeat her UA next week. She was also placed on 3 days of prednisone and prescribed an epi pen. I reviewed these with her relevant to her upcoming surgery. She reports she is significantly improved now.        
Declines

## 2023-11-13 ENCOUNTER — APPOINTMENT (OUTPATIENT)
Dept: PLASTIC SURGERY | Facility: CLINIC | Age: 48
End: 2023-11-13
